# Patient Record
Sex: FEMALE | Race: WHITE | NOT HISPANIC OR LATINO | Employment: UNEMPLOYED | URBAN - METROPOLITAN AREA
[De-identification: names, ages, dates, MRNs, and addresses within clinical notes are randomized per-mention and may not be internally consistent; named-entity substitution may affect disease eponyms.]

---

## 2023-04-05 PROBLEM — L30.9 DERMATITIS: Status: ACTIVE | Noted: 2023-04-05

## 2023-04-05 PROBLEM — G90.A POTS (POSTURAL ORTHOSTATIC TACHYCARDIA SYNDROME): Status: ACTIVE | Noted: 2023-04-05

## 2023-04-05 PROBLEM — H93.13 TINNITUS OF BOTH EARS: Status: ACTIVE | Noted: 2023-04-05

## 2023-04-05 PROBLEM — G43.109 MIGRAINE WITH AURA AND WITHOUT STATUS MIGRAINOSUS, NOT INTRACTABLE: Status: ACTIVE | Noted: 2023-04-05

## 2023-04-05 RX ORDER — TRIAMCINOLONE ACETONIDE 1 MG/G
OINTMENT TOPICAL
COMMUNITY
Start: 2021-07-15

## 2023-04-05 RX ORDER — SUMATRIPTAN 50 MG/1
TABLET, FILM COATED ORAL
COMMUNITY
Start: 2017-02-28 | End: 2023-04-07

## 2023-04-05 RX ORDER — TOPIRAMATE 25 MG/1
TABLET ORAL
COMMUNITY
Start: 2017-02-28

## 2023-04-07 ENCOUNTER — OFFICE VISIT (OUTPATIENT)
Dept: PRIMARY CARE | Facility: CLINIC | Age: 33
End: 2023-04-07
Payer: COMMERCIAL

## 2023-04-07 VITALS
HEART RATE: 75 BPM | SYSTOLIC BLOOD PRESSURE: 108 MMHG | WEIGHT: 161 LBS | HEIGHT: 62 IN | DIASTOLIC BLOOD PRESSURE: 73 MMHG | OXYGEN SATURATION: 95 % | BODY MASS INDEX: 29.63 KG/M2

## 2023-04-07 DIAGNOSIS — H92.01 EAR PAIN, RIGHT: Primary | ICD-10-CM

## 2023-04-07 PROCEDURE — 1036F TOBACCO NON-USER: CPT | Performed by: FAMILY MEDICINE

## 2023-04-07 PROCEDURE — 99203 OFFICE O/P NEW LOW 30 MIN: CPT | Performed by: FAMILY MEDICINE

## 2023-04-07 RX ORDER — CIPROFLOXACIN AND DEXAMETHASONE 3; 1 MG/ML; MG/ML
4 SUSPENSION/ DROPS AURICULAR (OTIC) 2 TIMES DAILY
Qty: 7.5 ML | Refills: 0 | Status: SHIPPED | OUTPATIENT
Start: 2023-04-07 | End: 2023-04-14

## 2023-04-07 ASSESSMENT — ENCOUNTER SYMPTOMS
DIZZINESS: 0
DYSURIA: 0
CONFUSION: 0
BLOOD IN STOOL: 0
TROUBLE SWALLOWING: 0
WHEEZING: 0
COUGH: 0
ABDOMINAL PAIN: 0
UNEXPECTED WEIGHT CHANGE: 0
SINUS PAIN: 0
NUMBNESS: 0
FEVER: 0
SHORTNESS OF BREATH: 0
WEAKNESS: 0
RHINORRHEA: 0
DIFFICULTY URINATING: 0
NAUSEA: 0
LIGHT-HEADEDNESS: 0
VOMITING: 0
SINUS PRESSURE: 0
CHILLS: 0
DIARRHEA: 0

## 2023-04-07 NOTE — PROGRESS NOTES
Subjective   Patient ID: Heidi Love is a 32 y.o. female who presents for Establish Care (Pt presents as a new pt states both ears are bothering hear, clogged and can't hear x 1 wk.BL).  HPI  C/o right ear feeling clogged x1 week. Tried ear wax softening drops and ear candle, got some wax out, can now hear out of her right ear. Some right ear pain. Denies discharge/drainage, congestion, postnasal drainage, fever, chills. Otherwise feeling well.    Left ear not bothering her.    Review of Systems   Constitutional:  Negative for chills, fever and unexpected weight change.   HENT:  Positive for ear pain (right) and hearing loss (right, but improved since home cleaning). Negative for congestion, postnasal drip, rhinorrhea, sinus pressure, sinus pain and trouble swallowing.    Respiratory:  Negative for cough, shortness of breath and wheezing.    Cardiovascular:  Negative for chest pain.   Gastrointestinal:  Negative for abdominal pain, blood in stool, diarrhea, nausea and vomiting.   Genitourinary:  Negative for difficulty urinating and dysuria.   Skin:  Negative for rash.   Neurological:  Negative for dizziness, syncope, weakness, light-headedness and numbness.   Psychiatric/Behavioral:  Negative for behavioral problems and confusion.        Objective   Physical Exam  Vitals and nursing note reviewed.   Constitutional:       General: She is not in acute distress.     Appearance: Normal appearance.   HENT:      Head: Normocephalic and atraumatic.      Right Ear: External ear normal. There is impacted cerumen (visualized EAC and small portion of TM otherwise health-appearing).      Left Ear: Tympanic membrane, ear canal and external ear normal. There is no impacted cerumen.   Eyes:      General: No scleral icterus.     Extraocular Movements: Extraocular movements intact.      Conjunctiva/sclera: Conjunctivae normal.   Pulmonary:      Effort: Pulmonary effort is normal. No respiratory distress.   Skin:     General:  Skin is warm and dry.      Coloration: Skin is not jaundiced.   Neurological:      Mental Status: She is alert and oriented to person, place, and time. Mental status is at baseline.   Psychiatric:         Mood and Affect: Mood normal.         Thought Content: Thought content normal.         Assessment/Plan   Diagnoses and all orders for this visit:  Ear pain, right  Comments:  Discussed ear cleaning her v. alternatives. Wishes to see ENT. Refer to ENT for possible cerumen removal. Start Ciprodex.  Orders:  -     Referral to ENT; Future  -     ciprofloxacin-dexamethasone (CiproDEX) otic suspension; Administer 4 drops into the right ear in the morning and 4 drops before bedtime. Do all this for 7 days.

## 2023-04-07 NOTE — PATIENT INSTRUCTIONS
Otolarynglogy (ENT)  Dr. Alexx Graham, Rodrigo Tejeda, Oliver Knox, Jewel Verma ([place]) [phone]  Dr. Breezy Maciel ([place]) [phone]  Dr. Courtney Ro ([place]) [phone]  Dr. Fredy Waller ([place]) [phone]    Recommend scheduling a wellness visit within 1-2 months.    Please return or seek medical attention if symptoms persist, change, worsen, or return. For emergencies, call 9-1-1 or go to the nearest Emergency Room.

## 2023-10-16 ENCOUNTER — APPOINTMENT (OUTPATIENT)
Dept: PRIMARY CARE | Facility: CLINIC | Age: 33
End: 2023-10-16
Payer: COMMERCIAL

## 2023-10-20 ENCOUNTER — OFFICE VISIT (OUTPATIENT)
Dept: PRIMARY CARE | Facility: CLINIC | Age: 33
End: 2023-10-20
Payer: COMMERCIAL

## 2023-10-20 VITALS
HEIGHT: 61 IN | WEIGHT: 157.38 LBS | SYSTOLIC BLOOD PRESSURE: 103 MMHG | OXYGEN SATURATION: 96 % | DIASTOLIC BLOOD PRESSURE: 69 MMHG | BODY MASS INDEX: 29.71 KG/M2 | HEART RATE: 68 BPM | TEMPERATURE: 98 F

## 2023-10-20 DIAGNOSIS — B08.4 HAND, FOOT AND MOUTH DISEASE (HFMD): Primary | ICD-10-CM

## 2023-10-20 PROBLEM — L92.0 GRANULOMA ANNULARE: Status: ACTIVE | Noted: 2023-10-20

## 2023-10-20 PROCEDURE — 99213 OFFICE O/P EST LOW 20 MIN: CPT | Performed by: FAMILY MEDICINE

## 2023-10-20 PROCEDURE — 1036F TOBACCO NON-USER: CPT | Performed by: FAMILY MEDICINE

## 2023-10-20 ASSESSMENT — ENCOUNTER SYMPTOMS
WEAKNESS: 0
LIGHT-HEADEDNESS: 0
OCCASIONAL FEELINGS OF UNSTEADINESS: 0
DYSURIA: 0
LOSS OF SENSATION IN FEET: 0
VOMITING: 0
CHILLS: 0
COUGH: 0
DIFFICULTY URINATING: 0
DEPRESSION: 0
DIZZINESS: 0
ABDOMINAL PAIN: 0
UNEXPECTED WEIGHT CHANGE: 0
TROUBLE SWALLOWING: 0
CONFUSION: 0
FEVER: 0
BLOOD IN STOOL: 0
DIARRHEA: 0
WHEEZING: 0
SHORTNESS OF BREATH: 0
NAUSEA: 0
NUMBNESS: 0

## 2023-10-20 ASSESSMENT — PATIENT HEALTH QUESTIONNAIRE - PHQ9
1. LITTLE INTEREST OR PLEASURE IN DOING THINGS: NOT AT ALL
2. FEELING DOWN, DEPRESSED OR HOPELESS: NOT AT ALL
SUM OF ALL RESPONSES TO PHQ9 QUESTIONS 1 AND 2: 0

## 2023-10-20 NOTE — PATIENT INSTRUCTIONS
Please return or seek medical attention if symptoms persist, change, worsen, or return. For emergencies, call 9-1-1 or go to the nearest Emergency Room.      For assistance with scheduling referrals or consultations, please call [phone]. For laboratory, radiology, and other tests, please call [phone] ([phone] for pediatrics). Please review prescription inserts and published information for possible adverse effects of all medications. Return after testing or consultation to review results and recommendations, if symptoms persist, change, worsen, or return, or if you have any question or concern. If you do not get results within 7-10 days, or you have any question or concern, please send a message, call the office ([phone]), or return to the office for a follow-up appointment. For non-emergencies, you may call the office. For emergencies, call 9-1-1 or go to the nearest Emergency Department. Please schedule additional appointment(s) to address concern(s) not addressed today.    In general, results are not released or discussed over the telephone. Results of tests done through [facility] are released via  [employer] (see below).  [email or web address]   [employer] support line: [phone]    Until we complete our transition to the new system, additional information can be found at [email or web address] or on your Android or iOS (iPhone, iPad) device using the [employer] rick available free of charge in your device's rick store.

## 2023-10-20 NOTE — PROGRESS NOTES
"Subjective   Patient ID: Heidi Love is a 33 y.o. female who presents for Rash (Pt presents with a painful rash, all over palms of hands, under toes, under breasts, headache x 1 day.BL).  HPI    Woke up this morning with painful bumps on hands and feet. Also on her forearms. Under her chin, under left breast a couple red dots, too. Also c/o bitemporal headache.    Denies oral sores, sore throat, fever, chills, sweats, ill contacts, cough, NVD. Son does have a rash in the diaper area that is unusual for him.      Review of Systems   Constitutional:  Negative for chills, fever and unexpected weight change.   HENT:  Negative for ear pain and trouble swallowing.    Respiratory:  Negative for cough, shortness of breath and wheezing.    Cardiovascular:  Negative for chest pain.   Gastrointestinal:  Negative for abdominal pain, blood in stool, diarrhea, nausea and vomiting.   Genitourinary:  Negative for difficulty urinating and dysuria.   Skin:  Positive for rash.   Neurological:  Negative for dizziness, syncope, weakness, light-headedness and numbness.   Psychiatric/Behavioral:  Negative for behavioral problems and confusion.          Objective   /69   Pulse 68   Temp 36.7 °C (98 °F)   Ht 1.549 m (5' 1\")   Wt 71.4 kg (157 lb 6 oz)   SpO2 96%   BMI 29.74 kg/m²     Physical Exam  Vitals and nursing note reviewed.   Constitutional:       General: She is not in acute distress.     Appearance: Normal appearance.   HENT:      Head: Normocephalic and atraumatic.   Eyes:      General: No scleral icterus.     Extraocular Movements: Extraocular movements intact.      Conjunctiva/sclera: Conjunctivae normal.   Pulmonary:      Effort: Pulmonary effort is normal. No respiratory distress.   Skin:     General: Skin is warm and dry.      Coloration: Skin is not jaundiced.      Findings: Rash (erythematous blanching papular rash on the hands and feet, with some lesions on the forearms) present.   Neurological:      " Mental Status: She is alert and oriented to person, place, and time. Mental status is at baseline.   Psychiatric:         Mood and Affect: Mood normal.         Thought Content: Thought content normal.         Assessment/Plan   Problem List Items Addressed This Visit       Hand, foot and mouth disease (HFMD) - Primary     Day 0, supportive care.

## 2024-06-11 ENCOUNTER — PRE-ADMISSION TESTING (OUTPATIENT)
Dept: PREADMISSION TESTING | Facility: HOSPITAL | Age: 34
End: 2024-06-11
Payer: COMMERCIAL

## 2024-06-11 ENCOUNTER — LAB REQUISITION (OUTPATIENT)
Dept: LAB | Facility: HOSPITAL | Age: 34
End: 2024-06-11
Payer: COMMERCIAL

## 2024-06-11 VITALS
DIASTOLIC BLOOD PRESSURE: 72 MMHG | SYSTOLIC BLOOD PRESSURE: 118 MMHG | BODY MASS INDEX: 28.32 KG/M2 | OXYGEN SATURATION: 100 % | HEART RATE: 69 BPM | HEIGHT: 61 IN | WEIGHT: 150 LBS | TEMPERATURE: 98.1 F | RESPIRATION RATE: 16 BRPM

## 2024-06-11 DIAGNOSIS — Z01.818 PREOPERATIVE TESTING: Primary | ICD-10-CM

## 2024-06-11 DIAGNOSIS — Z01.818 ENCOUNTER FOR OTHER PREPROCEDURAL EXAMINATION: ICD-10-CM

## 2024-06-11 LAB
ABO GROUP (TYPE) IN BLOOD: NORMAL
ANION GAP SERPL CALC-SCNC: 9 MMOL/L
ANTIBODY SCREEN: NORMAL
ATRIAL RATE: 67 BPM
BUN SERPL-MCNC: 12 MG/DL (ref 8–25)
CALCIUM SERPL-MCNC: 9.4 MG/DL (ref 8.5–10.4)
CHLORIDE SERPL-SCNC: 102 MMOL/L (ref 97–107)
CO2 SERPL-SCNC: 27 MMOL/L (ref 24–31)
CREAT SERPL-MCNC: 0.8 MG/DL (ref 0.4–1.6)
EGFRCR SERPLBLD CKD-EPI 2021: >90 ML/MIN/1.73M*2
ERYTHROCYTE [DISTWIDTH] IN BLOOD BY AUTOMATED COUNT: 13.3 % (ref 11.5–14.5)
GLUCOSE SERPL-MCNC: 101 MG/DL (ref 65–99)
HCT VFR BLD AUTO: 42.9 % (ref 36–46)
HGB BLD-MCNC: 14.1 G/DL (ref 12–16)
MCH RBC QN AUTO: 29.7 PG (ref 26–34)
MCHC RBC AUTO-ENTMCNC: 32.9 G/DL (ref 32–36)
MCV RBC AUTO: 90 FL (ref 80–100)
NRBC BLD-RTO: 0 /100 WBCS (ref 0–0)
P AXIS: 61 DEGREES
P OFFSET: 200 MS
P ONSET: 156 MS
PLATELET # BLD AUTO: 294 X10*3/UL (ref 150–450)
POTASSIUM SERPL-SCNC: 3.9 MMOL/L (ref 3.4–5.1)
PR INTERVAL: 112 MS
Q ONSET: 212 MS
QRS COUNT: 11 BEATS
QRS DURATION: 86 MS
QT INTERVAL: 416 MS
QTC CALCULATION(BAZETT): 439 MS
QTC FREDERICIA: 431 MS
R AXIS: 41 DEGREES
RBC # BLD AUTO: 4.75 X10*6/UL (ref 4–5.2)
RH FACTOR (ANTIGEN D): NORMAL
SODIUM SERPL-SCNC: 138 MMOL/L (ref 133–145)
T AXIS: 39 DEGREES
T OFFSET: 420 MS
VENTRICULAR RATE: 67 BPM
WBC # BLD AUTO: 10 X10*3/UL (ref 4.4–11.3)

## 2024-06-11 PROCEDURE — 85027 COMPLETE CBC AUTOMATED: CPT

## 2024-06-11 PROCEDURE — 86900 BLOOD TYPING SEROLOGIC ABO: CPT

## 2024-06-11 PROCEDURE — 87081 CULTURE SCREEN ONLY: CPT | Mod: TRILAB

## 2024-06-11 PROCEDURE — 86850 RBC ANTIBODY SCREEN: CPT

## 2024-06-11 PROCEDURE — 36415 COLL VENOUS BLD VENIPUNCTURE: CPT

## 2024-06-11 PROCEDURE — 86901 BLOOD TYPING SEROLOGIC RH(D): CPT

## 2024-06-11 PROCEDURE — 93005 ELECTROCARDIOGRAM TRACING: CPT

## 2024-06-11 PROCEDURE — 82374 ASSAY BLOOD CARBON DIOXIDE: CPT

## 2024-06-11 RX ORDER — FERROUS SULFATE 325(65) MG
65 TABLET, DELAYED RELEASE (ENTERIC COATED) ORAL
COMMUNITY

## 2024-06-11 RX ORDER — CHLORHEXIDINE GLUCONATE ORAL RINSE 1.2 MG/ML
SOLUTION DENTAL
Qty: 473 ML | Refills: 0 | Status: SHIPPED | OUTPATIENT
Start: 2024-06-11

## 2024-06-11 RX ORDER — FLUTICASONE PROPIONATE 50 MCG
1 SPRAY, SUSPENSION (ML) NASAL DAILY PRN
COMMUNITY

## 2024-06-11 ASSESSMENT — ENCOUNTER SYMPTOMS
CARDIOVASCULAR NEGATIVE: 1
RESPIRATORY NEGATIVE: 1
PSYCHIATRIC NEGATIVE: 1
NEUROLOGICAL NEGATIVE: 1
EYES NEGATIVE: 1
GASTROINTESTINAL NEGATIVE: 1
CONSTITUTIONAL NEGATIVE: 1
MUSCULOSKELETAL NEGATIVE: 1

## 2024-06-11 NOTE — CPM/PAT H&P
CPM/PAT Evaluation       Name: Heidi Love (Heidi Love)  /Age: 1990/34 y.o.     In-Person       Chief Complaint: Abnormal uterine bleeding, pelvic pain     HPI  A 34-year-old female with abnormal uterine bleeding, pelvic pain.  History of ovarian cyst and abnormal heavy menstrual bleeding lasting 2 to 3 weeks at a time as well as spotting in between cycles despite undergoing uterine ablation on 2023.  Denies fever, chills, nausea, dysuria, or hematuria.  She is scheduled for robotic laparoscopic hysterectomy with cystoscopy.    Past Medical History:   Diagnosis Date    ADHD (attention deficit hyperactivity disorder)     Was medicated my whole childhood and teenage years    Anemia     Anxiety     Chronic headaches     Migraines    Depression     Dizziness     Dysfunctional uterine bleeding     Very long heavy periods, ablation did not remedy    Impacted cerumen, bilateral 2017    Excessive ear wax, bilateral    Polycystic ovarian disease     PONV (postoperative nausea and vomiting) , , ,     Nauseated after epidural    Pregnant ([facility] , , ,     Skin disorder     Auto immune skin issue    Syncope     Urinary tract infection        Past Surgical History:   Procedure Laterality Date    COLPOSCOPY      ENDOMETRIAL ABLATION  2023    HYSTEROSCOPY      OOPHORECTOMY  2021    OTHER SURGICAL HISTORY  07/15/2021    [place] tooth extraction         Allergies   Allergen Reactions    Bee Venom Protein (Honey Bee) Anaphylaxis and Swelling    Peanut Anaphylaxis    Apple Rash    Banana Rash    Carrot GI Upset    Lactose Diarrhea     ABDOMINAL PAIN       Current Outpatient Medications   Medication Sig Dispense Refill    chlorhexidine (Peridex) 0.12 % solution Use cap to measure 15 mL.  Swish/gargle mouthwash for at least 30 seconds.  Do not swallow.  Use night before surgery after brushing teeth and morning of  "surgery after brushing teeth. 473 mL 0    ferrous sulfate 325 (65 Fe) MG EC tablet Take 65 mg by mouth once daily with breakfast. Do not crush, chew, or split.      fluticasone (Flonase Allergy Relief) 50 mcg/actuation nasal spray Administer 1 spray into each nostril once daily as needed for rhinitis. Shake gently. Before first use, prime pump. After use, clean tip and replace cap.      multivit-min/iron/folic acid/K (ADULTS MULTIVITAMIN ORAL) Take 1 tablet by mouth once daily.      triamcinolone (Kenalog) 0.1 % ointment Apply topically once daily as needed.       No current facility-administered medications for this visit.       Review of Systems   Constitutional: Negative.    HENT: Negative.     Eyes: Negative.    Respiratory: Negative.     Cardiovascular: Negative.    Gastrointestinal: Negative.    Genitourinary:  Positive for vaginal bleeding.   Musculoskeletal: Negative.    Skin: Negative.    Neurological: Negative.    Psychiatric/Behavioral: Negative.          Physical Exam  Vitals reviewed.   HENT:      Head: Normocephalic and atraumatic.      Mouth/Throat:      Mouth: Mucous membranes are moist.   Eyes:      Pupils: Pupils are equal, round, and reactive to light.   Cardiovascular:      Rate and Rhythm: Normal rate and regular rhythm.   Pulmonary:      Effort: Pulmonary effort is normal.      Breath sounds: Normal breath sounds.   Abdominal:      Palpations: Abdomen is soft.   Musculoskeletal:         General: Normal range of motion.      Cervical back: Normal range of motion.   Skin:     General: Skin is warm and dry.   Neurological:      Mental Status: She is alert and oriented to person, place, and time.   Psychiatric:         Mood and Affect: Mood normal.          PAT AIRWAY:   Airway:     Mallampati::  II    Neck ROM::  Full  normal        /72   Pulse 69   Temp 36.7 °C (98.1 °F) (Temporal)   Resp 16   Ht 1.549 m (5' 1\")   Wt 68 kg (150 lb)   SpO2 100%   BMI 28.34 kg/m²       Stop Bang Score " 0     CHADS 2 score: 1.9%  DASI score: 58.2  METS score: 9.9  Revised cardiac risk index: 0.9%  ASA II  ARISCAT 1.6%  PAT orders CBC, BMP, type and screen, EKG  EKG at PAT normal sinus rhythm confirmed by cardiologist      Assessment and Plan:     Abnormal uterine bleeding, pelvic pain Plan: Robotic laparoscopic hysterectomy with cystoscopy.  POTS   Anxiety  Migraine  PCOS  BMI 28.34

## 2024-06-11 NOTE — H&P (VIEW-ONLY)
CPM/PAT Evaluation       Name: Heidi Love (Heidi Love)  /Age: 1990/34 y.o.     In-Person       Chief Complaint: Abnormal uterine bleeding, pelvic pain     HPI  A 34-year-old female with abnormal uterine bleeding, pelvic pain.  History of ovarian cyst and abnormal heavy menstrual bleeding lasting 2 to 3 weeks at a time as well as spotting in between cycles despite undergoing uterine ablation on 2023.  Denies fever, chills, nausea, dysuria, or hematuria.  She is scheduled for robotic laparoscopic hysterectomy with cystoscopy.    Past Medical History:   Diagnosis Date    ADHD (attention deficit hyperactivity disorder)     Was medicated my whole childhood and teenage years    Anemia     Anxiety     Chronic headaches     Migraines    Depression     Dizziness     Dysfunctional uterine bleeding     Very long heavy periods, ablation did not remedy    Impacted cerumen, bilateral 2017    Excessive ear wax, bilateral    Polycystic ovarian disease     PONV (postoperative nausea and vomiting) , , ,     Nauseated after epidural    Pregn[facility]) , , ,     Skin disorder     Auto immune skin issue    Syncope     Urinary tract infection        Past Surgical History:   Procedure Laterality Date    COLPOSCOPY      ENDOMETRIAL ABLATION  2023    HYSTEROSCOPY      OOPHORECTOMY  2021    OTHER SURGICAL HISTORY  07/15/2021    [place] tooth extraction         Allergies   Allergen Reactions    Bee Venom Protein (Honey Bee) Anaphylaxis and Swelling    Peanut Anaphylaxis    Apple Rash    Banana Rash    Carrot GI Upset    Lactose Diarrhea     ABDOMINAL PAIN       Current Outpatient Medications   Medication Sig Dispense Refill    chlorhexidine (Peridex) 0.12 % solution Use cap to measure 15 mL.  Swish/gargle mouthwash for at least 30 seconds.  Do not swallow.  Use night before surgery after brushing teeth and morning of  "surgery after brushing teeth. 473 mL 0    ferrous sulfate 325 (65 Fe) MG EC tablet Take 65 mg by mouth once daily with breakfast. Do not crush, chew, or split.      fluticasone (Flonase Allergy Relief) 50 mcg/actuation nasal spray Administer 1 spray into each nostril once daily as needed for rhinitis. Shake gently. Before first use, prime pump. After use, clean tip and replace cap.      multivit-min/iron/folic acid/K (ADULTS MULTIVITAMIN ORAL) Take 1 tablet by mouth once daily.      triamcinolone (Kenalog) 0.1 % ointment Apply topically once daily as needed.       No current facility-administered medications for this visit.       Review of Systems   Constitutional: Negative.    HENT: Negative.     Eyes: Negative.    Respiratory: Negative.     Cardiovascular: Negative.    Gastrointestinal: Negative.    Genitourinary:  Positive for vaginal bleeding.   Musculoskeletal: Negative.    Skin: Negative.    Neurological: Negative.    Psychiatric/Behavioral: Negative.          Physical Exam  Vitals reviewed.   HENT:      Head: Normocephalic and atraumatic.      Mouth/Throat:      Mouth: Mucous membranes are moist.   Eyes:      Pupils: Pupils are equal, round, and reactive to light.   Cardiovascular:      Rate and Rhythm: Normal rate and regular rhythm.   Pulmonary:      Effort: Pulmonary effort is normal.      Breath sounds: Normal breath sounds.   Abdominal:      Palpations: Abdomen is soft.   Musculoskeletal:         General: Normal range of motion.      Cervical back: Normal range of motion.   Skin:     General: Skin is warm and dry.   Neurological:      Mental Status: She is alert and oriented to person, place, and time.   Psychiatric:         Mood and Affect: Mood normal.          PAT AIRWAY:   Airway:     Mallampati::  II    Neck ROM::  Full  normal        /72   Pulse 69   Temp 36.7 °C (98.1 °F) (Temporal)   Resp 16   Ht 1.549 m (5' 1\")   Wt 68 kg (150 lb)   SpO2 100%   BMI 28.34 kg/m²       Stop Bang Score " 0     CHADS 2 score: 1.9%  DASI score: 58.2  METS score: 9.9  Revised cardiac risk index: 0.9%  ASA II  ARISCAT 1.6%  PAT orders CBC, BMP, type and screen, EKG  EKG at PAT normal sinus rhythm confirmed by cardiologist      Assessment and Plan:     Abnormal uterine bleeding, pelvic pain Plan: Robotic laparoscopic hysterectomy with cystoscopy.  POTS   Anxiety  Migraine  PCOS  BMI 28.34

## 2024-06-11 NOTE — PREPROCEDURE INSTRUCTIONS
Preoperative Fasting Guidelines    Why must I stop eating and drinking near surgery time?  With sedation, food or liquid in your stomach can enter your lungs causing serious complications  Increases nausea and vomiting    When do I need to stop eating and drinking before my surgery?  Do not eat any food after midnight the night before your surgery/procedure.  You may have up to 13.5 ounces of clear liquid until TWO hours before your instructed arrival time to the hospital.  This includes water, black tea/coffee, (no milk or cream) apple juice, and electrolyte drinks (Gatorade)-PLEASE DRINK YOUR ENSURE DRINK   You may chew gum until TWO hours before your surgery/procedure  PAT DISCHARGE INSTRUCTIONS    Please call the Same Day Surgery (SDS) Department of the hospital where your procedure will be performed after 2:00 PM the day before your surgery. If you are scheduled on a Monday, or a Tuesday following a Monday holiday, you will need to call on the last business day prior to your surgery.    [address], [id number] [phone]  Second Floor        Please let your surgeon know if:      You develop any open sores, shingles, burning or painful urination as these may increase your risk of an infection.   You no longer wish to have the surgery.   Any other personal circumstances change that may lead to the need to cancel or defer this surgery-such as being sick or getting admitted to any hospital within one week of your planned procedure.    Your contact details change, such as a change of address or phone number.    Starting now:     Please DO NOT drink alcohol or smoke for 24 hours before surgery. It is well known that quitting smoking can make a huge difference to your health and recovery from surgery. The longer you abstain from smoking, the better your chances of a healthy recovery. If you need help with quitting, call 8-509-QUIT-NOW to be  connected to a trained counselor who will discuss the best methods to help you quit.     Before your surgery:    Please stop all supplements 7 days prior to surgery. Or as directed by your surgeon.   Please stop taking NSAID pain medicine such as Advil and Motrin 7 days before surgery.    If you develop any fever, cough, cold, rashes, cuts, scratches, scrapes, urinary symptoms or infection anywhere on your body (including teeth and gums) prior to surgery, please call your surgeon’s office as soon as possible. This may require treatment to reduce the chance of cancellation on the day of surgery.    The day before your surgery:   DIET- Please follow the diet instructions at the top of your packet.   Get a good night’s rest.  Use the special soap for bathing if you have been instructed to use one.    Scheduled surgery times may change and you will be notified if this occurs - please check your personal voicemail for any updates.     On the morning of surgery:   Wear comfortable, loose fitting clothes which open in the front. Please do not wear moisturizers, creams, lotions, makeup or perfume.    Please bring with you to surgery:   Photo ID and insurance card   Current list of medicines and allergies   Pacemaker/ Defibrillator/Heart stent cards   CPAP machine and mask    Slings/ splints/ crutches   A copy of your complete advanced directive/DHPOA.    Please do NOT bring with you to surgery:   All jewelry and valuables should be left at home.   Prosthetic devices such as contact lenses, hearing aids, dentures, eyelash extensions, hairpins and body piercings must be removed prior to going in to the surgical suite.    After outpatient surgery:   A responsible adult MUST accompany you at the time of discharge and stay with you for 24 hours after your surgery. You may NOT drive yourself home after surgery.    Do not drive, operate machinery, make critical decisions or do activities that require co-ordination or balance until  after a night’s sleep.   Do not drink alcoholic beverages for 24 hours.   Instructions for resuming your medications will be provided by your surgeon.    CALL YOUR DOCTOR AFTER SURGERY IF YOU HAVE:     Chills and/or a fever of 101° F or higher.    Redness, swelling, pus or drainage from your surgical wound or a bad smell from the wound.    Lightheadedness, fainting or confusion.    Persistent vomiting (throwing up) and are not able to eat or drink for 12 hours.    Three or more loose, watery bowel movements in 24 hours (diarrhea).   Difficulty or pain while urinating( after non-urological surgery)    Pain and swelling in your legs, especially if it is only on one side.    Difficulty breathing or are breathing faster than normal.    Any new concerning symptoms.      Patient Information: Pre-Operative Infection Prevention Measures     Why did I have my nose, under my arms, and groin swabbed?  The purpose of the swab is to identify Staphylococcus aureus inside your nose or on your skin.  The swab was sent to the laboratory for culture.  A positive swab/culture for Staphylococcus aureus is called colonization or carriage.      What is Staphylococcus aureus?  Staphylococcus aureus, also known as “staph”, is a germ found on the skin or in the nose of healthy people.  Sometimes Staphylococcus aureus can get into the body and cause an infection.  This can be minor (such as pimples, boils, or other skin problems).  It might also be serious (such as a blood infection, pneumonia, or a surgical site infection).    What is Staphylococcus aureus colonization or carriage?  Colonization or carriage means that a person has the germ but is not sick from it.  These bacteria can be spread on the hands or when breathing or sneezing.    How is Staphylococcus aureus spread?  It is most often spread by close contact with a person or item that carries it.    What happens if my culture is positive for Staphylococcus aureus?  Your  doctor/medical team will use this information to guide any antibiotic treatment which may be necessary.  Regardless of the culture results, we will clean the inside of your nose with a betadine swab just before you have your surgery.      Will I get an infection if I have Staphylococcus aureus in my nose or on my skin?  Anyone can get an infection with Staphylococcus aureus.  However, the best way to reduce your risk of infection is to follow the instructions provided to you for the use of your CHG soap and dental rinse.        Patient Information: Oral/Dental Rinse    What is oral/dental rinse?   It is a mouthwash. It is a way of cleaning the mouth with a germ-killing solution before your surgery.  The solution contains chlorhexidine, commonly known as CHG.   It is used inside the mouth to kill a bacteria known as Staphylococcus aureus.  Let your doctor know if you are allergic to Chlorhexidine.    Why do I need to use CHG oral/dental rinse?  The CHG oral/dental rinse helps to kill a bacteria in your mouth known as Staphylococcus aureus.     This reduces the risk of infection at the surgical site.      Using your CHG oral/dental rinse  STEPS:  Use your CHG oral/dental rinse after you brush your teeth the night before (at bedtime) and the morning of your surgery.  Follow all directions on your prescription label.    Use the cap on the container to measure 15ml   Swish (gargle if you can) the mouthwash in your mouth for at least 30 seconds, (do not swallow) and spit out  After you use your CHG rinse, do not rinse your mouth with water, drink or eat.  Please refer to the prescription label for the appropriate time to resume oral intake      What side effects might I have using the CHG oral/dental rinse?  CHG rinse will stick to plaque on the teeth.  Brush and floss just before use.  Teeth brushing will help avoid staining of plaque during use.      Patient Information: Home Preoperative Antibacterial Shower      What  is a home preoperative antibacterial shower?  This shower is a way of cleaning the skin with a germ-killing solution before surgery.  The solution contains chlorhexidine, commonly known as CHG.  CHG is a skin cleanser with germ-killing ability.  Let your doctor know if you are allergic to chlorhexidine.    Why do I need to take a preoperative antibacterial shower?  Skin is not sterile.  It is best to try to make your skin as free of germs as possible before surgery.  Proper cleansing with a germ-killing soap before surgery can lower the number of germs on your skin.  This helps to reduce the risk of infection at the surgical site.  Following the instructions listed below will help you prepare your skin for surgery.      How do I use the solution?  Steps:  Begin using your CHG soap 5 days before your scheduled surgery on ________________________.    First, wash and rinse your hair using the CHG soap. Keep CHG soap away from ear canals and eyes.  Rinse completely, do not condition.  Hair extensions should be removed.  Wash your face with your normal soap and rinse.    Apply the CHG solution to a clean wet washcloth.  Turn the water off or move away from the water spray to avoid premature rinsing of the CHG soap as you are applying.   Firmly lather your entire body from the neck down.  Do not use on your face.  Pay special attention to the area(s) where your incision(s) will be located unless they are on your face.  Avoid scrubbing your skin too hard.  The important point is to have the CHG soap sit on your skin for 3 minutes.    When the 3 minutes are up, turn on the water and rinse the CHG solution off your body completely.   DO NOT wash with regular soap after you have used the CHG soap solution  Pat yourself dry with a clean, freshly-laundered towel.  DO NOT apply powders, deodorants, or lotions.  Dress in clean, freshly laundered nightclothes.    Be sure to sleep with clean, freshly laundered sheets.  Be aware  that CHG will cause stains on fabrics; if you wash them with bleach after use.  Rinse your washcloth and other linens that have contact with CHG completely.  Use only non-chlorine detergents to launder the items used.   The morning of surgery is the fifth day.  Repeat the above steps and dress in clean comfortable clothing     Whom should I contact if I have any questions regarding the use of CHG soap?  Call the [facility] at [phone] if you have any questions.                  Medication List            Accurate as of June 11, 2024  1:31 PM. Always use your most recent med list.                ADULTS MULTIVITAMIN ORAL  Medication Adjustments for Surgery: Stop 7 days before surgery     ferrous sulfate 325 (65 Fe) MG EC tablet  Medication Adjustments for Surgery: Stop 1 day before surgery     Flonase Allergy Relief 50 mcg/actuation nasal spray  Generic drug: fluticasone  Notes to patient: Take as needed.     triamcinolone 0.1 % ointment  Commonly known as: Kenalog  Notes to patient: Take as needed.

## 2024-06-13 LAB — STAPHYLOCOCCUS SPEC CULT: ABNORMAL

## 2024-06-18 ENCOUNTER — ANESTHESIA EVENT (OUTPATIENT)
Dept: OPERATING ROOM | Facility: HOSPITAL | Age: 34
End: 2024-06-18
Payer: COMMERCIAL

## 2024-06-18 ENCOUNTER — PREP FOR PROCEDURE (OUTPATIENT)
Dept: OPERATING ROOM | Facility: HOSPITAL | Age: 34
End: 2024-06-18

## 2024-06-18 ENCOUNTER — ANESTHESIA (OUTPATIENT)
Dept: OPERATING ROOM | Facility: HOSPITAL | Age: 34
End: 2024-06-18
Payer: COMMERCIAL

## 2024-06-18 ENCOUNTER — HOSPITAL ENCOUNTER (OUTPATIENT)
Facility: HOSPITAL | Age: 34
Setting detail: OUTPATIENT SURGERY
Discharge: HOME | End: 2024-06-18
Attending: OBSTETRICS & GYNECOLOGY | Admitting: OBSTETRICS & GYNECOLOGY
Payer: COMMERCIAL

## 2024-06-18 ENCOUNTER — PHARMACY VISIT (OUTPATIENT)
Dept: PHARMACY | Facility: CLINIC | Age: 34
End: 2024-06-18
Payer: MEDICAID

## 2024-06-18 VITALS
RESPIRATION RATE: 15 BRPM | SYSTOLIC BLOOD PRESSURE: 106 MMHG | HEART RATE: 63 BPM | DIASTOLIC BLOOD PRESSURE: 71 MMHG | TEMPERATURE: 96.8 F | OXYGEN SATURATION: 95 %

## 2024-06-18 DIAGNOSIS — N93.9 ABNORMAL UTERINE BLEEDING: ICD-10-CM

## 2024-06-18 DIAGNOSIS — Z01.818 PREOPERATIVE TESTING: ICD-10-CM

## 2024-06-18 DIAGNOSIS — G89.18 POSTOPERATIVE PAIN: Primary | ICD-10-CM

## 2024-06-18 DIAGNOSIS — R10.2 FEMALE PELVIC PAIN: ICD-10-CM

## 2024-06-18 LAB
ABO GROUP (TYPE) IN BLOOD: NORMAL
GLUCOSE BLD MANUAL STRIP-MCNC: 90 MG/DL (ref 74–99)
PREGNANCY TEST URINE, POC: NEGATIVE
RH FACTOR (ANTIGEN D): NORMAL

## 2024-06-18 PROCEDURE — A4550 SURGICAL TRAYS: HCPCS | Performed by: OBSTETRICS & GYNECOLOGY

## 2024-06-18 PROCEDURE — 36415 COLL VENOUS BLD VENIPUNCTURE: CPT | Performed by: NURSE PRACTITIONER

## 2024-06-18 PROCEDURE — 2500000004 HC RX 250 GENERAL PHARMACY W/ HCPCS (ALT 636 FOR OP/ED): Performed by: OBSTETRICS & GYNECOLOGY

## 2024-06-18 PROCEDURE — 7100000002 HC RECOVERY ROOM TIME - EACH INCREMENTAL 1 MINUTE: Performed by: OBSTETRICS & GYNECOLOGY

## 2024-06-18 PROCEDURE — 2500000005 HC RX 250 GENERAL PHARMACY W/O HCPCS: Performed by: NURSE ANESTHETIST, CERTIFIED REGISTERED

## 2024-06-18 PROCEDURE — 2500000001 HC RX 250 WO HCPCS SELF ADMINISTERED DRUGS (ALT 637 FOR MEDICARE OP): Performed by: ANESTHESIOLOGY

## 2024-06-18 PROCEDURE — 81025 URINE PREGNANCY TEST: CPT | Performed by: OBSTETRICS & GYNECOLOGY

## 2024-06-18 PROCEDURE — 7100000009 HC PHASE TWO TIME - INITIAL BASE CHARGE: Performed by: OBSTETRICS & GYNECOLOGY

## 2024-06-18 PROCEDURE — 2500000004 HC RX 250 GENERAL PHARMACY W/ HCPCS (ALT 636 FOR OP/ED): Performed by: NURSE ANESTHETIST, CERTIFIED REGISTERED

## 2024-06-18 PROCEDURE — 7100000010 HC PHASE TWO TIME - EACH INCREMENTAL 1 MINUTE: Performed by: OBSTETRICS & GYNECOLOGY

## 2024-06-18 PROCEDURE — 2720000007 HC OR 272 NO HCPCS: Performed by: OBSTETRICS & GYNECOLOGY

## 2024-06-18 PROCEDURE — 88307 TISSUE EXAM BY PATHOLOGIST: CPT | Mod: TC | Performed by: OBSTETRICS & GYNECOLOGY

## 2024-06-18 PROCEDURE — 3700000002 HC GENERAL ANESTHESIA TIME - EACH INCREMENTAL 1 MINUTE: Performed by: OBSTETRICS & GYNECOLOGY

## 2024-06-18 PROCEDURE — 82947 ASSAY GLUCOSE BLOOD QUANT: CPT

## 2024-06-18 PROCEDURE — 3700000001 HC GENERAL ANESTHESIA TIME - INITIAL BASE CHARGE: Performed by: OBSTETRICS & GYNECOLOGY

## 2024-06-18 PROCEDURE — 2500000004 HC RX 250 GENERAL PHARMACY W/ HCPCS (ALT 636 FOR OP/ED): Performed by: ANESTHESIOLOGY

## 2024-06-18 PROCEDURE — 3600000017 HC OR TIME - EACH INCREMENTAL 1 MINUTE - PROCEDURE LEVEL SIX: Performed by: OBSTETRICS & GYNECOLOGY

## 2024-06-18 PROCEDURE — 3600000018 HC OR TIME - INITIAL BASE CHARGE - PROCEDURE LEVEL SIX: Performed by: OBSTETRICS & GYNECOLOGY

## 2024-06-18 PROCEDURE — RXMED WILLOW AMBULATORY MEDICATION CHARGE

## 2024-06-18 PROCEDURE — 2500000001 HC RX 250 WO HCPCS SELF ADMINISTERED DRUGS (ALT 637 FOR MEDICARE OP): Performed by: OBSTETRICS & GYNECOLOGY

## 2024-06-18 PROCEDURE — 7100000001 HC RECOVERY ROOM TIME - INITIAL BASE CHARGE: Performed by: OBSTETRICS & GYNECOLOGY

## 2024-06-18 RX ORDER — FENTANYL CITRATE 50 UG/ML
25 INJECTION, SOLUTION INTRAMUSCULAR; INTRAVENOUS EVERY 5 MIN PRN
Status: DISCONTINUED | OUTPATIENT
Start: 2024-06-18 | End: 2024-06-18 | Stop reason: HOSPADM

## 2024-06-18 RX ORDER — ACETAMINOPHEN 325 MG/1
975 TABLET ORAL ONCE
Status: COMPLETED | OUTPATIENT
Start: 2024-06-18 | End: 2024-06-18

## 2024-06-18 RX ORDER — SCOLOPAMINE TRANSDERMAL SYSTEM 1 MG/1
1 PATCH, EXTENDED RELEASE TRANSDERMAL
Status: DISCONTINUED | OUTPATIENT
Start: 2024-06-18 | End: 2024-06-18 | Stop reason: HOSPADM

## 2024-06-18 RX ORDER — FENTANYL CITRATE 50 UG/ML
INJECTION, SOLUTION INTRAMUSCULAR; INTRAVENOUS AS NEEDED
Status: DISCONTINUED | OUTPATIENT
Start: 2024-06-18 | End: 2024-06-18

## 2024-06-18 RX ORDER — KETOROLAC TROMETHAMINE 30 MG/ML
INJECTION, SOLUTION INTRAMUSCULAR; INTRAVENOUS AS NEEDED
Status: DISCONTINUED | OUTPATIENT
Start: 2024-06-18 | End: 2024-06-18

## 2024-06-18 RX ORDER — LIDOCAINE HYDROCHLORIDE 10 MG/ML
INJECTION INFILTRATION; PERINEURAL AS NEEDED
Status: DISCONTINUED | OUTPATIENT
Start: 2024-06-18 | End: 2024-06-18

## 2024-06-18 RX ORDER — LIDOCAINE HYDROCHLORIDE 10 MG/ML
0.1 INJECTION INFILTRATION; PERINEURAL ONCE
Status: DISCONTINUED | OUTPATIENT
Start: 2024-06-18 | End: 2024-06-18 | Stop reason: HOSPADM

## 2024-06-18 RX ORDER — OXYCODONE HYDROCHLORIDE 5 MG/1
5 TABLET ORAL EVERY 6 HOURS PRN
Qty: 12 TABLET | Refills: 0 | Status: SHIPPED | OUTPATIENT
Start: 2024-06-18

## 2024-06-18 RX ORDER — METHOCARBAMOL 100 MG/ML
INJECTION, SOLUTION INTRAMUSCULAR; INTRAVENOUS AS NEEDED
Status: DISCONTINUED | OUTPATIENT
Start: 2024-06-18 | End: 2024-06-18

## 2024-06-18 RX ORDER — ROCURONIUM BROMIDE 10 MG/ML
INJECTION, SOLUTION INTRAVENOUS AS NEEDED
Status: DISCONTINUED | OUTPATIENT
Start: 2024-06-18 | End: 2024-06-18

## 2024-06-18 RX ORDER — SODIUM CHLORIDE 9 MG/ML
100 INJECTION, SOLUTION INTRAVENOUS CONTINUOUS
Status: CANCELLED | OUTPATIENT
Start: 2024-06-18

## 2024-06-18 RX ORDER — CELECOXIB 200 MG/1
400 CAPSULE ORAL ONCE
Status: CANCELLED | OUTPATIENT
Start: 2024-06-18 | End: 2024-06-18

## 2024-06-18 RX ORDER — SODIUM CHLORIDE, SODIUM LACTATE, POTASSIUM CHLORIDE, CALCIUM CHLORIDE 600; 310; 30; 20 MG/100ML; MG/100ML; MG/100ML; MG/100ML
100 INJECTION, SOLUTION INTRAVENOUS CONTINUOUS
Status: DISCONTINUED | OUTPATIENT
Start: 2024-06-18 | End: 2024-06-18 | Stop reason: HOSPADM

## 2024-06-18 RX ORDER — METOCLOPRAMIDE 10 MG/1
10 TABLET ORAL ONCE
Status: COMPLETED | OUTPATIENT
Start: 2024-06-18 | End: 2024-06-18

## 2024-06-18 RX ORDER — FAMOTIDINE 20 MG/1
20 TABLET, FILM COATED ORAL ONCE AS NEEDED
Status: COMPLETED | OUTPATIENT
Start: 2024-06-18 | End: 2024-06-18

## 2024-06-18 RX ORDER — GABAPENTIN 600 MG/1
600 TABLET ORAL ONCE
Status: CANCELLED | OUTPATIENT
Start: 2024-06-18 | End: 2024-06-18

## 2024-06-18 RX ORDER — ACETAMINOPHEN 325 MG/1
975 TABLET ORAL ONCE
Status: CANCELLED | OUTPATIENT
Start: 2024-06-18 | End: 2024-06-18

## 2024-06-18 RX ORDER — CEFAZOLIN 1 G/1
INJECTION, POWDER, FOR SOLUTION INTRAVENOUS AS NEEDED
Status: DISCONTINUED | OUTPATIENT
Start: 2024-06-18 | End: 2024-06-18

## 2024-06-18 RX ORDER — IBUPROFEN 600 MG/1
600 TABLET ORAL EVERY 6 HOURS
Qty: 40 TABLET | Refills: 0 | Status: SHIPPED | OUTPATIENT
Start: 2024-06-18

## 2024-06-18 RX ORDER — FENTANYL CITRATE 50 UG/ML
50 INJECTION, SOLUTION INTRAMUSCULAR; INTRAVENOUS EVERY 5 MIN PRN
Status: DISCONTINUED | OUTPATIENT
Start: 2024-06-18 | End: 2024-06-18 | Stop reason: HOSPADM

## 2024-06-18 RX ORDER — ACETAMINOPHEN 500 MG
1000 TABLET ORAL EVERY 6 HOURS
Qty: 40 TABLET | Refills: 0 | Status: SHIPPED | OUTPATIENT
Start: 2024-06-18

## 2024-06-18 RX ORDER — FAMOTIDINE 10 MG/ML
20 INJECTION INTRAVENOUS ONCE
Status: DISCONTINUED | OUTPATIENT
Start: 2024-06-18 | End: 2024-06-18 | Stop reason: HOSPADM

## 2024-06-18 RX ORDER — ONDANSETRON 4 MG/1
4 TABLET, FILM COATED ORAL EVERY 8 HOURS PRN
Qty: 10 TABLET | Refills: 0 | Status: SHIPPED | OUTPATIENT
Start: 2024-06-18

## 2024-06-18 RX ORDER — METOCLOPRAMIDE HYDROCHLORIDE 5 MG/ML
10 INJECTION INTRAMUSCULAR; INTRAVENOUS ONCE
Status: DISCONTINUED | OUTPATIENT
Start: 2024-06-18 | End: 2024-06-18 | Stop reason: HOSPADM

## 2024-06-18 RX ORDER — CELECOXIB 200 MG/1
400 CAPSULE ORAL ONCE
Status: COMPLETED | OUTPATIENT
Start: 2024-06-18 | End: 2024-06-18

## 2024-06-18 RX ORDER — ONDANSETRON HYDROCHLORIDE 2 MG/ML
4 INJECTION, SOLUTION INTRAVENOUS ONCE
Status: DISCONTINUED | OUTPATIENT
Start: 2024-06-18 | End: 2024-06-18 | Stop reason: HOSPADM

## 2024-06-18 RX ORDER — NORETHINDRONE AND ETHINYL ESTRADIOL 0.5-0.035
50 KIT ORAL ONCE AS NEEDED
Status: DISCONTINUED | OUTPATIENT
Start: 2024-06-18 | End: 2024-06-18 | Stop reason: HOSPADM

## 2024-06-18 RX ORDER — GABAPENTIN 600 MG/1
600 TABLET ORAL ONCE
Status: COMPLETED | OUTPATIENT
Start: 2024-06-18 | End: 2024-06-18

## 2024-06-18 RX ORDER — CEFAZOLIN SODIUM 2 G/100ML
2 INJECTION, SOLUTION INTRAVENOUS ONCE
Status: DISCONTINUED | OUTPATIENT
Start: 2024-06-18 | End: 2024-06-18 | Stop reason: HOSPADM

## 2024-06-18 RX ORDER — ONDANSETRON HYDROCHLORIDE 2 MG/ML
INJECTION, SOLUTION INTRAVENOUS AS NEEDED
Status: DISCONTINUED | OUTPATIENT
Start: 2024-06-18 | End: 2024-06-18

## 2024-06-18 RX ORDER — BUPIVACAINE HYDROCHLORIDE 5 MG/ML
INJECTION, SOLUTION PERINEURAL AS NEEDED
Status: DISCONTINUED | OUTPATIENT
Start: 2024-06-18 | End: 2024-06-18 | Stop reason: HOSPADM

## 2024-06-18 RX ORDER — PROPOFOL 10 MG/ML
INJECTION, EMULSION INTRAVENOUS CONTINUOUS PRN
Status: DISCONTINUED | OUTPATIENT
Start: 2024-06-18 | End: 2024-06-18

## 2024-06-18 RX ORDER — SODIUM CHLORIDE 9 MG/ML
100 INJECTION, SOLUTION INTRAVENOUS CONTINUOUS
Status: DISCONTINUED | OUTPATIENT
Start: 2024-06-18 | End: 2024-06-18 | Stop reason: HOSPADM

## 2024-06-18 RX ORDER — MIDAZOLAM HYDROCHLORIDE 1 MG/ML
INJECTION, SOLUTION INTRAMUSCULAR; INTRAVENOUS AS NEEDED
Status: DISCONTINUED | OUTPATIENT
Start: 2024-06-18 | End: 2024-06-18

## 2024-06-18 RX ORDER — CEFAZOLIN SODIUM 2 G/100ML
2 INJECTION, SOLUTION INTRAVENOUS ONCE
Status: CANCELLED | OUTPATIENT
Start: 2024-06-18 | End: 2024-06-18

## 2024-06-18 SDOH — HEALTH STABILITY: MENTAL HEALTH: CURRENT SMOKER: 0

## 2024-06-18 ASSESSMENT — PAIN SCALES - GENERAL
PAINLEVEL_OUTOF10: 0 - NO PAIN
PAINLEVEL_OUTOF10: 7
PAINLEVEL_OUTOF10: 5 - MODERATE PAIN
PAINLEVEL_OUTOF10: 0 - NO PAIN
PAINLEVEL_OUTOF10: 0 - NO PAIN
PAIN_LEVEL: 3
PAINLEVEL_OUTOF10: 7
PAINLEVEL_OUTOF10: 0 - NO PAIN
PAINLEVEL_OUTOF10: 7

## 2024-06-18 ASSESSMENT — PAIN - FUNCTIONAL ASSESSMENT
PAIN_FUNCTIONAL_ASSESSMENT: 0-10
PAIN_FUNCTIONAL_ASSESSMENT: FLACC (FACE, LEGS, ACTIVITY, CRY, CONSOLABILITY)
PAIN_FUNCTIONAL_ASSESSMENT: CPOT (CRITICAL CARE PAIN OBSERVATION TOOL)
PAIN_FUNCTIONAL_ASSESSMENT: FLACC (FACE, LEGS, ACTIVITY, CRY, CONSOLABILITY)
PAIN_FUNCTIONAL_ASSESSMENT: CPOT (CRITICAL CARE PAIN OBSERVATION TOOL)
PAIN_FUNCTIONAL_ASSESSMENT: 0-10
PAIN_FUNCTIONAL_ASSESSMENT: 0-10

## 2024-06-18 ASSESSMENT — PAIN DESCRIPTION - DESCRIPTORS: DESCRIPTORS: CRAMPING;ACHING

## 2024-06-18 NOTE — ANESTHESIA POSTPROCEDURE EVALUATION
Patient: Heidi Love    Procedure Summary       Date: 06/18/24 Room / Location: [facility] OR 12 / Virtual [facility]    Anesthesia Start: 0948 Anesthesia Stop: 1055    Procedure: Robotic Laparoscopic Hysterectomy with Cystoscopy (Abdomen) Diagnosis:       Abnormal uterine bleeding      Female pelvic pain      Post endometrial ablation syndrome      (Abnormal uterine bleeding [N93.9])      (Female pelvic pain [R10.2])    Surgeons: Habibeh M Gitiforooz, MD Responsible Provider: Desmond Briceño MD    Anesthesia Type: general ASA Status: 2            Anesthesia Type: general    Vitals Value Taken Time   /89 06/18/24 1200   Temp 36 °C (96.8 °F) 06/18/24 1115   Pulse 66 06/18/24 1200   Resp 16 06/18/24 1200   SpO2 98 % 06/18/24 1150       Anesthesia Post Evaluation    Patient location during evaluation: bedside  Patient participation: complete - patient participated  Level of consciousness: awake  Pain score: 3  Pain management: adequate  Multimodal analgesia pain management approach  Airway patency: patent  Two or more strategies used to mitigate risk of obstructive sleep apnea  Cardiovascular status: acceptable  Respiratory status: acceptable  Hydration status: acceptable  Postoperative Nausea and Vomiting: none        No notable events documented.

## 2024-06-18 NOTE — OP NOTE
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  Anesthesia Staff: Anesthesiologist: Desmond Briceño MD  CRNA: JOSE CARLOS Huerta-CRNA  Estimated Blood Loss: 50mL  Intra-op Medications:   Administrations occurring from 1000 to 1230 on 24:   Medication Name Total Dose   BUPivacaine HCl (Marcaine) 0.5 % (5 mg/mL) injection 50 mL              Anesthesia Record               Intraprocedure I/O Totals          Intake    Propofol Drip 0.00 mL    The total shown is the total volume documented since Anesthesia Start was filed.    Total Intake 0 mL       Output    Urine 100 mL    Total Output 100 mL       Net    Net Volume -100 mL          Specimen:   ID Type Source Tests Collected by Time   1 : uterus and cervix Tissue UTERUS WITH CERVIX SURGICAL PATHOLOGY EXAM Habibeh M Gitiforooz, MD 2024 1015        Staff:   Jevonulator: Esperanza  Circulator: Edna Everett Person: Arron  Scrub Person: Hiral      Findings:   - Examination under anesthesia confirmed a midline, mobile, diffusely enlarged uterus measuring approximately 8 weeks in  size.   - Upon visualization, the uterus appeared boggy and enlarged. Bilateral fallopian tubes surgically absent. Bilateral ovaries appeared normal. The upper abdomen was visualized and appeared normal.  -Cystoscopy, the bladder was found to be free of stones, injury or sutures.  There was no evidence of trauma or bleeding.  The bladder dome was intact.  Brisk ureteral jets were seen bilaterally.    Indications: Heidi Love is an 34 y.o. female who is having surgery for Abnormal uterine bleeding [N93.9]  Female pelvic pain [R10.2] and post endometrial ablation syndrome.     The patient was seen in the preoperative area. The risks, benefits, complications, treatment options, non-operative alternatives, expected recovery and outcomes were discussed with the patient. The possibilities of reaction to medication, pulmonary aspiration, injury to surrounding structures, bleeding, recurrent infection, the need for additional procedures, failure to diagnose a condition, and creating a complication requiring transfusion or operation were discussed with the patient. The patient concurred with the proposed plan, giving informed consent.  The site of surgery was properly noted/marked if necessary per policy. The patient has been actively warmed in preoperative area. Preoperative antibiotics have been ordered and given within 1 hours of incision. Venous thrombosis prophylaxis have been ordered including bilateral sequential compression devices    Procedure Details:   The patient was brought to the operating room on a stretcher and placed on the operating table in supine position.  General anesthesia was obtained without difficulty.  The patient was then brought into the dorsal lithotomy position, and exam under anesthesia revealed those findings documented above. The patient was then prepped and draped in the usual sterile manner for a laparoscopic hysterectomy.  A time-out was performed and the patient's name, procedure,  allergies, and preoperative medications were confirmed.    A Johnson cathter was placed. Two retractors were inserted into the vagina. The cervix was grasped with a single-toothed tenaculum and the uterine manipulator device was then inserted into the uterine cavity without difficulty. The single-toothed tenaculum was removed and the device was secured.    Attention was then turned towards the abdomen. An Allis clamp was placed supra-umbilically and the skin was injected with 0.5% Marcaine. Using a #11 blade a supra-umbilical skin incision was made. Towel clamps were used to elevate the patient's pannus and a 8mm optical trocar was introduced into the abdomen. The abdomen was insufflated with carbon dioxide gas to a final pressure of 15 mm Hg. Atraumatic entry was confirmed. The patient was placed in steep Trendelenburg. Three additional ports were placed under direct visualization. One 8 mm port to the left of the midline, approximately 8 cm apart. Next, one 8 mm port was placed to the right of the midline, approximately 8cm apart and a 8 mm right sided assistant port was placed more laterally and inferior. The [employer] robot was then docked, all ports were secured to the robotic arms, and robotic instruments were introduced under direct visualization.    Survey of the pelvis revealed those findings described above. The ureter course was identified and outlined bilaterally. Procedure was challenging due to size of the boggy uterus making manipulation of the uterus from side to side, as well as anterior to posterior challenging. Due to complexity of the case, Dr. Pimentel was present to assist with the surgery while I was working on the robotic .     Next, the left uteroovarian ligament and left round ligament, as well as the anterior and posterior leaves of the broad ligament, were serially coagulated and . The posterior peritoneum was taken down to the level of the colpotimizer cup. The anterior  leaf of the broad ligament was opened and the bladder was dissected down below the level of the cervix creating the bladder flap. The uterine vessels were then identified, skeletonized, desiccated using bipolar forceps, and divided. There was significant aberrant vasculature in the area of the uterine's which caused some bleeding from the specimen. The uterine artery was dropped down to below the level of the colpotimizer cup. These steps were repeated in identical fashion on the patient's right side. A circumferential colpotomy was made. The uterus and cervix were removed from the vagina. A glove with a ray miguel in it was placed in the vagina to maintain pneumoperitoneum. The vaginal cuff was then closed in a double-layer with 0 V-loc suture in a running fashion. Hemostasis was visualized. The cuff and all pedicles were irrigated and found to be hemostatic.     Next, the Johnson catheter was removed and cystoscopy using a 30 degree scope was then performed and bilateral ureteral jets were noted. A 360 degree view of the bladder revealed no evidence of intraoperative injury. The bladder was then emptied. The glove was removed from the vagina. Vaginal sweep was performed and found to be negative.     Attention was turned to the abdomen again. The robotic instruments were removed. The robot was undocked. The patient was taken out of Trendelenburg and once again, hemostasis observed. The ports were removed under direct visualization. The abdomen was desufflated. The skin was closed with subcuticular sutures of 4-0 Monocryl and steri-strips.    All sponge and instrument counts were correct upon conclusion of the case. The patient was extubated and transferred to the recovery room in stable condition.    Complications:  None; patient tolerated the procedure well.    Disposition: PACU - hemodynamically stable.  Condition: stable     Habibeh M Gitiforooz  Phone Number: [phone]

## 2024-06-18 NOTE — ANESTHESIA PROCEDURE NOTES
Airway  Date/Time: 6/18/2024 9:58 AM  Urgency: elective    Difficult airway    Staffing  Performed: attending   Authorized by: Desmond Briceño MD    Performed by: JOSE CARLOS Huerta-BROOKLY[place]  Patient location during procedure: OR    Indications and Patient Condition  Indications for airway management: anesthesia and airway protection  Spontaneous ventilation: present  Sedation level: deep  Preoxygenated: yes  Mask difficulty assessment: 1 - vent by mask    Final Airway Details  Final airway type: endotracheal airway      Successful airway: ETT  Cuffed: yes   Successful intubation technique: video laryngoscopy  Facilitating devices/methods: intubating stylet  Endotracheal tube insertion site: oral  Blade: Yolanda  Blade size: #3  ETT size (mm): 7.5  Cormack-Lehane Classification: grade I - full view of glottis  Placement verified by: chest auscultation, capnometry and palpation of cuff   Cuff volume (mL): 7  Measured from: lips  ETT to lips (cm): 22  Number of attempts at approach: 1

## 2024-06-18 NOTE — ANESTHESIA PREPROCEDURE EVALUATION
Patient: Heidi Love    Procedure Information       Date/Time: 06/18/24 1000    Procedure: Robotic Laparoscopic Hysterectomy with Cystoscopy    Location: [facility] / [facility]    Surgeons: Habibeh M Gitiforooz, MD            Visit Vitals  /63   Pulse 76   Temp 36.6 °C (97.9 °F) (Temporal)   Resp 16   SpO2 99%   OB Status Having periods   Smoking Status Former        Current Outpatient Medications   Medication Instructions    chlorhexidine (Peridex) 0.12 % solution Use cap to measure 15 mL.  Swish/gargle mouthwash for at least 30 seconds.  Do not swallow.  Use night before surgery after brushing teeth and morning of surgery after brushing teeth.    ferrous sulfate 65 mg, oral, Daily with breakfast, Do not crush, chew, or split.    fluticasone (Flonase Allergy Relief) 50 mcg/actuation nasal spray 1 spray, Each Nostril, Daily PRN, Shake gently. Before first use, prime pump. After use, clean tip and replace cap.    multivit-min/iron/folic acid/K (ADULTS MULTIVITAMIN ORAL) 1 tablet, oral, Daily    triamcinolone (Kenalog) 0.1 % ointment Apply topically once daily as needed.        Allergies   Allergen Reactions    Bee Venom Protein (Honey Bee) Anaphylaxis and Swelling    Peanut Anaphylaxis    Apple Rash    Banana Rash    Carrot GI Upset    Lactose Diarrhea     ABDOMINAL PAIN        Past Surgical History:   Procedure Laterality Date    COLPOSCOPY  2023    ENDOMETRIAL ABLATION  12/27/2023    HYSTEROSCOPY  2023    OOPHORECTOMY  9/21/2021    OTHER SURGICAL HISTORY  07/15/2021    [place] tooth extraction        Relevant Problems   Neuro   (+) Chronic migraine      ID   (+) Hand, foot and mouth disease (HFMD)       Active Ambulatory Problems     Diagnosis Date Noted    Chronic migraine 04/05/2023    Migraine with aura and without status migrainosus, not intractable 04/05/2023    Dermatitis 04/05/2023    POTS (postural orthostatic tachycardia syndrome) 04/05/2023    Tinnitus of both ears 04/05/2023    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    Hand, foot and mouth disease (HFMD) 10/20/2023     Resolved Ambulatory Problems     Diagnosis Date Noted    No Resolved Ambulatory Problems     Past Medical History:   Diagnosis Date    ADHD (attention deficit hyperactivity disorder) 1999    Anemia 2012    Anxiety 2010    Chronic headaches 2016    Depression 2010    Dizziness 2016    Dysfunctional uterine bleeding 2023    Impacted cerumen, bilateral 02/28/2017    Polycystic ovarian disease 2024    PONV (postoperative nausea and vomiting) 2012, 2013, 2015, 2021    Pregnant ([facility] 2012, 2013, 2015, 2021    Skin disorder 2022    Syncope 2016    Urinary tract infection 2010       Clinical information reviewed:   Tobacco  Allergies  Meds   Med Hx  Surg Hx  OB Status  Fam Hx  Soc   Hx        NPO Detail:    NPO/Void Status  Carbohydrate Drink Given Prior to Surgery? : Y  Date of Last Liquid: 06/18/24  Time of Last Liquid: 0610  Date of Last Solid: 06/17/24  Time of Last Solid: 1800  Last Intake Type: Carbohydrate drink  Time of Last Void: 0805         Physical Exam    Airway  Mallampati: II  TM distance: >3 FB  Neck ROM: full     Cardiovascular - normal exam     Dental - normal exam     Pulmonary - normal exam     Abdominal            Anesthesia Plan    History of general anesthesia?: yes  History of complications of general anesthesia?: no    ASA 2     The patient is not a current smoker.    intravenous induction   Anesthetic plan and risks discussed with patient and spouse.    Plan discussed with CRNA.

## 2024-06-18 NOTE — NURSING NOTE
Pt sitting up .  Alert .  Sig other present and supportive.  Surgical sites clean and dry x6.  Discharge instructions discussed with pt and sig other.  No vaginal bleeding noted on tj pad.  Tolerates sips of ginger ale.

## 2024-06-18 NOTE — PERIOPERATIVE NURSING NOTE
0805--Patient ambulated to [facility] accompanied by her significant other Nam.  Patient is alert and oriented, denies pain at this time and reports her skin is intact.  Patient states she did her CHG wash and mouthwash last night and this morning.    0910--Patient ambulated to the restroom.    0925--Dr. Briceño at the bedside. Verbal order for scopolamine patch given.

## 2024-06-18 NOTE — PERIOPERATIVE NURSING NOTE
Assumed Care of Patient. Report received from Tena Giordano RN. Initial assessment complete. Oral Airway Removed. VSS on Cardiac Monitor. Patient appears resting comfortably at present. No Signs or Symptoms of Pain Noted.

## 2024-06-18 NOTE — NURSING NOTE
Assist pt up to bathroom.  Void noted for 225ml.  Discharge instructions discussed and handed to pt.

## 2024-06-20 LAB
LABORATORY COMMENT REPORT: NORMAL
PATH REPORT.FINAL DX SPEC: NORMAL
PATH REPORT.GROSS SPEC: NORMAL
PATH REPORT.RELEVANT HX SPEC: NORMAL
PATH REPORT.TOTAL CANCER: NORMAL

## 2025-03-28 ENCOUNTER — TELEMEDICINE (OUTPATIENT)
Dept: PRIMARY CARE | Facility: CLINIC | Age: 35
End: 2025-03-28
Payer: COMMERCIAL

## 2025-03-28 ENCOUNTER — APPOINTMENT (OUTPATIENT)
Dept: PRIMARY CARE | Facility: CLINIC | Age: 35
End: 2025-03-28
Payer: COMMERCIAL

## 2025-03-28 DIAGNOSIS — J01.90 ACUTE SINUSITIS, RECURRENCE NOT SPECIFIED, UNSPECIFIED LOCATION: Primary | ICD-10-CM

## 2025-03-28 PROCEDURE — 99213 OFFICE O/P EST LOW 20 MIN: CPT | Performed by: FAMILY MEDICINE

## 2025-03-28 RX ORDER — AMOXICILLIN 875 MG/1
875 TABLET, FILM COATED ORAL 2 TIMES DAILY
Qty: 20 TABLET | Refills: 0 | Status: SHIPPED | OUTPATIENT
Start: 2025-03-28 | End: 2025-04-07

## 2025-03-28 NOTE — PROGRESS NOTES
Subjective   Patient ID: Heidi Love is a 34 y.o. female who presents for sinus congestion    HPI   34-year-old female presents the [facility] virtual care visit complaining of 2-week history of persistent sinus congestion, pressure, thick drainage and cough.  Denies any fevers, chills, shortness of breath.    Review of Systems  ROS as stated in HPI  Objective   There were no vitals taken for this visit.    Physical Exam  Virtual visit so no physical exam was done.  Patient appears alert and oriented and in no acute distress  Assessment/Plan   Problem List Items Addressed This Visit    None  Visit Diagnoses         Codes    Acute sinusitis, recurrence not specified, unspecified location    -  Primary J01.90    Relevant Medications    amoxicillin (Amoxil) 875 mg tablet        Supportive treatment  rx amox  Follow-up if symptoms persist or worsen    This visit was completed via VIRTUAL visit. All issues as above were discussed and addressed but no physical exam or vital signs were performed. If it was felt that the patient should be evaluated in clinic then they were directed there. The patient verbally consented to visit.

## (undated) DEVICE — SUTURE, MONOCRYL, 4-0, 27 IN, PS-2, UNDYED

## (undated) DEVICE — COVER, MAYO STAND, 23-1/2 X 56, LF

## (undated) DEVICE — Device

## (undated) DEVICE — LUBRICANT, ELECTROLUBE, F/ELECTRODE TIPS

## (undated) DEVICE — GLOVE, SURGICAL, PROTEXIS PI , 6.5, PF, LF

## (undated) DEVICE — TUBING, INSUFFLATION, W/ .3 MICRO FILTER & ADAPTER

## (undated) DEVICE — NEEDLE, HYPODERMIC, MONOJECT, 22 G X 1.5 IN, BLUE, RIGID PACK

## (undated) DEVICE — GLOVE, SURGICAL, PROTEXIS PI , 7.0, PF, LF

## (undated) DEVICE — IRRIGATION SET, CYSTOSCOPY, F/CONSTANT/INTERMITTENT, 8 GTT/CC, 77 IN

## (undated) DEVICE — POSITIONING SYSTEM, PAGAZZI, PATIENT

## (undated) DEVICE — MANIPULATOR, ADVINCULA DELINEATOR, 3.5CM

## (undated) DEVICE — STAPLER, SKIN, PLUS, WIDE, 35

## (undated) DEVICE — GOWN, SURGICAL, NON-REINFORCED, XLARGE, LF

## (undated) DEVICE — DRAPE, ARM XI

## (undated) DEVICE — CATHETER TRAY, URETHRAL, FOLEY, 16 FR, SILICONE

## (undated) DEVICE — GLOVE, SURGICAL, PROTEXIS PI , 6.0, PF, LF

## (undated) DEVICE — SPONGE, LAP, X-RAY, RF DETECT, 18 X 18IN, STERILE

## (undated) DEVICE — SUTURE, V-LOC, 2-0, 180 GR9 GS-21

## (undated) DEVICE — SEAL, UNIVERSAL 5-8MM  XI

## (undated) DEVICE — OBTURATOR, BLADELESS , SU

## (undated) DEVICE — GOWN, SURGICAL, SIRUS, NON REINFORCED, LARGE

## (undated) DEVICE — SOLUTION, INJECTION, SODIUM CHLORIDE 9%, 3000ML

## (undated) DEVICE — GLOVE, SURGICAL, PROTEXIS PI BLUE W/NEUTHERA, 6.5, PF, LF

## (undated) DEVICE — DRAPE, SHEET, LARGE, 70 X 85IN, STERILE

## (undated) DEVICE — DEVICE, PNEUVIEW XE, SMOKE ELIMINATION

## (undated) DEVICE — COVER, TIP HOT SHEARS ENDOWRIST